# Patient Record
(demographics unavailable — no encounter records)

---

## 2024-11-20 NOTE — HISTORY OF PRESENT ILLNESS
[FreeTextEntry1] : This is a pleasant 60-year-old male with a history of asthma, who presents for evaluation of positive FIT testing.  Patient has been screened for colon cancer by annual FIT tests, it was previous negative but this year it returned positive.  He reports feeling well.  He has no abdominal pain, nausea or vomiting.  He reports normal formed bowel movements daily, without any melena or hematochezia or diarrhea.  There is no colon cancer in her family.  Her sister had esophageal cancer.  He denies any reflux or dysphagia or trouble eating or swallowing.  He denies any abnormal weight loss.  He has never had any endoscopic evaluation before.  Recently had blood work done with his primary care doctor, reports no anemia or kidney disease and that everything was normal other than the positive fit test.

## 2024-11-20 NOTE — CONSULT LETTER
[Dear  ___] : Dear  [unfilled], [Consult Letter:] : I had the pleasure of evaluating your patient, [unfilled]. [Please see my note below.] : Please see my note below. [Consult Closing:] : Thank you very much for allowing me to participate in the care of this patient.  If you have any questions, please do not hesitate to contact me. [Sincerely,] : Sincerely, [FreeTextEntry2] : Dr. Ramon Adorno [FreeTextEntry3] : Jose De Jesus Barajas MD [facility] Gastroenterology  of Medicine, [facility] Tel: [phone] Fax: [phone]

## 2024-11-20 NOTE — ASSESSMENT
[FreeTextEntry1] : Impression: 1. Positive FIT test - needs colonoscopy now to rule out advanced adenomas, colorectal neoplasms  Plan: -Explained that a positive fit test could mean advanced adenomas, large polyps, or colorectal cancer. -Offered patient colonoscopy to rule out any colonic neoplasms or advanced adenomas.  Also explained that it can be caused by a upper source, though this has not been borne out on studies.  Patient at this time reports he would like to undergo only colonoscopy at this time as he has no upper GI tract symptoms -Risks and benefits of colonoscopy including but not limited to bleeding, infection, missed lesions, perforation, injury to internal organs, anesthesia/drug side effects were discussed with patient. All questions answered. Patient is agreeable to the procedure. -Diet and bowel prep instructions provided to patient

## 2024-11-20 NOTE — PHYSICAL EXAM
[Alert] : alert [Normal Voice/Communication] : normal voice/communication [Healthy Appearing] : healthy appearing [No Acute Distress] : no acute distress [Sclera] : the sclera and conjunctiva were normal [Hearing Threshold Finger Rub Not [place]] : hearing was normal [Normal Appearance] : the appearance of the neck was normal [No Respiratory Distress] : no respiratory distress [No Acc Muscle Use] : no accessory muscle use [Respiration, Rhythm And Depth] : normal respiratory rhythm and effort [Auscultation Breath Sounds / Voice Sounds] : lungs were clear to auscultation bilaterally [Heart Rate And Rhythm] : heart rate was normal and rhythm regular [Normal S1, S2] : normal S1 and S2 [Murmurs] : no murmurs [Bowel Sounds] : normal bowel sounds [Abdomen Tenderness] : non-tender [No Masses] : no abdominal mass palpated [Abdomen Soft] : soft [No CVA Tenderness] : no CVA  tenderness [Abnormal Walk] : normal gait [Normal Color / Pigmentation] : normal skin color and pigmentation [No Focal Deficits] : no focal deficits [Oriented To Time, Place, And Person] : oriented to person, place, and time